# Patient Record
Sex: MALE | Race: BLACK OR AFRICAN AMERICAN | NOT HISPANIC OR LATINO | Employment: FULL TIME | URBAN - METROPOLITAN AREA
[De-identification: names, ages, dates, MRNs, and addresses within clinical notes are randomized per-mention and may not be internally consistent; named-entity substitution may affect disease eponyms.]

---

## 2018-01-24 ENCOUNTER — EVALUATION (OUTPATIENT)
Dept: PHYSICAL THERAPY | Facility: CLINIC | Age: 63
End: 2018-01-24
Payer: COMMERCIAL

## 2018-01-24 DIAGNOSIS — M25.512 PAIN OF BOTH SHOULDER JOINTS: Primary | ICD-10-CM

## 2018-01-24 DIAGNOSIS — M25.511 PAIN OF BOTH SHOULDER JOINTS: Primary | ICD-10-CM

## 2018-01-24 PROCEDURE — G8990 OTHER PT/OT CURRENT STATUS: HCPCS | Performed by: PHYSICAL THERAPIST

## 2018-01-24 PROCEDURE — 97110 THERAPEUTIC EXERCISES: CPT | Performed by: PHYSICAL THERAPIST

## 2018-01-24 PROCEDURE — G8991 OTHER PT/OT GOAL STATUS: HCPCS | Performed by: PHYSICAL THERAPIST

## 2018-01-24 PROCEDURE — 97162 PT EVAL MOD COMPLEX 30 MIN: CPT | Performed by: PHYSICAL THERAPIST

## 2018-01-24 RX ORDER — VALSARTAN 160 MG/1
TABLET ORAL DAILY
COMMUNITY

## 2018-01-24 RX ORDER — ASPIRIN 81 MG/1
81 TABLET ORAL DAILY
COMMUNITY

## 2018-01-24 NOTE — PROGRESS NOTES
PT Evaluation     Today's date: 2018  Patient name: Amelie Samson  : 1955  MRN: [id number]  Referring provider: Sandee Zamora DO  Dx: No diagnosis found  Assessment  Impairments: abnormal or restricted ROM, impaired physical strength and scapular dyskinesis    Assessment details: Amelie Samson is a 63 y o  male who presents to physical therapy with Pain of both shoulder joints  Pt has difficulty with lifting, reaching overhead, laterally, and behind his back, and performing heavy tasks for his job as a   Concern for rotator cuff involvement secondary to positive full can test, positive painful arc, and weakness into scaption and external rotation  Amelie Samson would benefit from formal physical therapy to address impairments as detailed, decrease pain, and restore maximal level of function for all home, work, and mobility tasks  Understanding of Dx/Px/POC: good   Prognosis: good    Goals  Short-term goals:  1  Pt will decrease pain by 1-2 points within 4 weeks  2  Pt will improve ROM by 5-10 degrees within 4 weeks  3  Pt will improve strength by 1/2 grade within 4 weeks  4  Pt will improve physical FS score by 19 points by discharge  Long-term goals  1  Pt will reach in all planes without scapular compensation and with pain <3/10 by discharge  2  Pt will report compliance and demonstrate understanding of home exercise program by discharge  3  Pt will lift 9year old daughter with pain <3/10 by discharge      Plan  Patient would benefit from: skilled PT  Planned modality interventions: cryotherapy  Planned therapy interventions: flexibility, functional ROM exercises, joint mobilization, manual therapy, neuromuscular re-education, patient education, strengthening, stretching, postural training, therapeutic exercise and home exercise program  Frequency: 2x week  Duration in weeks: 4  Treatment plan discussed with: patient        Subjective Evaluation    History of Present Illness  Mechanism of injury: Pt states that shoulder pain has been worsening over 6-7 months  Pt states that pain wakes him up at night but is able to go back to sleep after placing his hands over his head  Pt denies trauma, falls, precipitating incident  Pt notes that shoulders "loosen up" after he's awake for a while but has difficulty with donning coat and pushing up through L arm  Pt has popping and clicking occasionally in bilateral shoulders  Pt denies numbness and tingling in bilateral upper extremities  Pt will be following up with ortho MD on 18  Quality of life: good    Pain  At best pain rating: 3  At worst pain ratin  Location: bilateral shoulders  Quality: burning  Relieving factors: change in position  Aggravating factors: overhead activity  Progression: worsening    Social Support    Employment status: working  Hand dominance: left      Diagnostic Tests  Abnormal MRI: does not know results  Treatments  Previous treatment: medication (did not help)  Patient Goals  Patient goals for therapy: increased motion and increased strength  Patient goal: Short-term goals:  1  Pt will decrease pain by 1-2 points within 4 weeks  2  Pt will improve ROM by 5-10 degrees within 4 weeks  3  Pt will improve strength by 1/2 grade within 4 weeks  4  Pt will improve physical FS score by 19 points by discharge  Long-term goals  1  Pt will reach in all planes without scapular compensation and with pain <3/10 by discharge  2  Pt will report compliance and demonstrate understanding of home exercise program by discharge  3  Pt will lift 9year old daughter with pain <3/10 by discharge  Objective     Palpation   Left   Tenderness of the pectoralis major, pectoralis minor and posterior deltoid  Right Tenderness of the pectoralis major, pectoralis minor and posterior deltoid       Cervical/Thoracic Screen   Cervical range of motion within normal limits    Active Range of Motion   Left Shoulder   Flexion: 161 degrees   Abduction: 145 degrees with pain  External rotation BTH: T4   Internal rotation BTB: T10 with pain    Right Shoulder   Flexion: 172 degrees   Abduction: 143 (pop with eccentric motion) degrees   External rotation BTH: T1 with pain  Internal rotation BTB: L1 with pain    Additional Active Range of Motion Details  Pt demonstrates [address] [facility] posture at rest, winging of bilateral scapulae    Passive Range of Motion   Left Shoulder   Flexion: 172 degrees   Abduction: 170 degrees   External rotation 90°: 93 degrees   Internal rotation 90°: 90 degrees     Right Shoulder   Flexion: 175 degrees   Abduction: 172 degrees   External rotation 90°: 92 degrees   Internal rotation 90°: 80 degrees     Additional Passive Range of Motion Details  Pain at end range with overpressure    Strength/Myotome Testing     Left Shoulder     Planes of Motion   Flexion: 4-   Abduction: 4 (pain)   Adduction: 5   External rotation at 0°: 3+   External rotation at 90°: 4+   Internal rotation at 0°: 5   Internal rotation at 90°: 5     Right Shoulder     Planes of Motion   Flexion: 4-   Abduction: 3+   Adduction: 4+   External rotation at 0°: 3   External rotation at 90°: 4+   Internal rotation at 0°: 5   Internal rotation at 90°: 4     Left Elbow   Flexion: 5  Extension: 5    Right Elbow   Flexion: 5  Extension: 5    Left Wrist/Hand   Wrist extension: 5  Wrist flexion: 5    Right Wrist/Hand   Wrist extension: 5  Wrist flexion: 5    Tests     Left Shoulder   Positive full can and painful arc  Negative AC shear  Right Shoulder   Positive full can and painful arc  Negative AC shear         Precautions: Dm, HTN, bee sting allergy    Daily Treatment Diary     Manual  1/24/18            Bilateral shoulder PROM np            Inferior glides bilateral shoulders np            Scapular mobs np                                          Exercise Diary  1/24/18            Self-caudal glides 5"x2 ea            Strap IR stretch 15" x3 ea            TB IR/ER Org 10x ea            ube nv            pulleys  nv            Tb shoulder extension nv            TB low rows nv            Ball circles at wall nv            Shoulder flexion to 90 deg nv            Shoulder scaption to 90 deg  nv            Posterior capsule stretch nv            Doorway pec stretch nv            Scap retraction with TB ER nv                                                                                                           Modalities  1/24/18            CP PRN np

## 2018-01-24 NOTE — LETTER
2018    Emile Tenorio, [address]  [facility]  [place]   [address]  [id number]    Patient: Sammy Horton   YOB: 1955   Date of Visit: 2018       Dear Dr Diego Williamson:    Please review the attached summary of Hasmukh Jacob progress and our plan for continued therapy, and verify that you agree therapy should continue by signing the attached document and sending it back to our office  If you have any questions or concerns, please don't hesitate to call  Sincerely,        Katheryn Mcqueen, PT          CC: No Recipients            PT Evaluation     Today's date: 2018  Patient name: Sammy Horton  : 1955  MRN: [id number]  Referring provider: Gladys Flanagan DO  Dx: No diagnosis found  Assessment  Impairments: abnormal or restricted ROM, impaired physical strength and scapular dyskinesis    Assessment details: Sammy Horton is a 63 y o  male who presents to physical therapy with Pain of both shoulder joints  Pt has difficulty with lifting, reaching overhead, laterally, and behind his back, and performing heavy tasks for his job as a   Concern for rotator cuff involvement secondary to positive full can test, positive painful arc, and weakness into scaption and external rotation  Sammy Horton would benefit from formal physical therapy to address impairments as detailed, decrease pain, and restore maximal level of function for all home, work, and mobility tasks  Understanding of Dx/Px/POC: good   Prognosis: good    Goals  Short-term goals:  1  Pt will decrease pain by 1-2 points within 4 weeks  2  Pt will improve ROM by 5-10 degrees within 4 weeks  3  Pt will improve strength by 1/2 grade within 4 weeks  4  Pt will improve physical FS score by 19 points by discharge  Long-term goals  1  Pt will reach in all planes without scapular compensation and with pain <3/10 by discharge    2  Pt will report compliance and demonstrate understanding of home exercise program by discharge  3  Pt will lift 9year old daughter with pain <3/10 by discharge  Plan  Patient would benefit from: skilled PT  Planned modality interventions: cryotherapy  Planned therapy interventions: flexibility, functional ROM exercises, joint mobilization, manual therapy, neuromuscular re-education, patient education, strengthening, stretching, postural training, therapeutic exercise and home exercise program  Frequency: 2x week  Duration in weeks: 4  Treatment plan discussed with: patient        Subjective Evaluation    History of Present Illness  Mechanism of injury: Pt states that shoulder pain has been worsening over 6-7 months  Pt states that pain wakes him up at night but is able to go back to sleep after placing his hands over his head  Pt denies trauma, falls, precipitating incident  Pt notes that shoulders "loosen up" after he's awake for a while but has difficulty with donning coat and pushing up through L arm  Pt has popping and clicking occasionally in bilateral shoulders  Pt denies numbness and tingling in bilateral upper extremities  Pt will be following up with ortho MD on 18  Quality of life: good    Pain  At best pain rating: 3  At worst pain ratin  Location: bilateral shoulders  Quality: burning  Relieving factors: change in position  Aggravating factors: overhead activity  Progression: worsening    Social Support    Employment status: working  Hand dominance: left      Diagnostic Tests  Abnormal MRI: does not know results  Treatments  Previous treatment: medication (did not help)  Patient Goals  Patient goals for therapy: increased motion and increased strength  Patient goal: Short-term goals:  1  Pt will decrease pain by 1-2 points within 4 weeks  2  Pt will improve ROM by 5-10 degrees within 4 weeks  3  Pt will improve strength by 1/2 grade within 4 weeks  4  Pt will improve physical FS score by 19 points by discharge    Long-term goals  1  Pt will reach in all planes without scapular compensation and with pain <3/10 by discharge  2  Pt will report compliance and demonstrate understanding of home exercise program by discharge  3  Pt will lift 9year old daughter with pain <3/10 by discharge  Objective     Palpation   Left   Tenderness of the pectoralis major, pectoralis minor and posterior deltoid  Right Tenderness of the pectoralis major, pectoralis minor and posterior deltoid       Cervical/Thoracic Screen   Cervical range of motion within normal limits    Active Range of Motion   Left Shoulder   Flexion: 161 degrees   Abduction: 145 degrees with pain  External rotation BTH: T4   Internal rotation BTB: T10 with pain    Right Shoulder   Flexion: 172 degrees   Abduction: 143 (pop with eccentric motion) degrees   External rotation BTH: T1 with pain  Internal rotation BTB: L1 with pain    Additional Active Range of Motion Details  Pt demonstrates [address] [facility] posture at rest, winging of bilateral scapulae    Passive Range of Motion   Left Shoulder   Flexion: 172 degrees   Abduction: 170 degrees   External rotation 90°:  93 degrees   Internal rotation 90°:  90 degrees     Right Shoulder   Flexion: 175 degrees   Abduction: 172 degrees   External rotation 90°:  92 degrees   Internal rotation 90°:  80 degrees     Additional Passive Range of Motion Details  Pain at end range with overpressure    Strength/Myotome Testing     Left Shoulder     Planes of Motion   Flexion: 4-   Abduction: 4 (pain)   Adduction: 5   External rotation at 0°:  3+   External rotation at 90°:  4+   Internal rotation at 0°:  5   Internal rotation at 90°:  5     Right Shoulder     Planes of Motion   Flexion: 4-   Abduction: 3+   Adduction: 4+   External rotation at 0°:  3   External rotation at 90°:  4+   Internal rotation at 0°:  5   Internal rotation at 90°:  4     Left Elbow   Flexion: 5  Extension: 5    Right Elbow   Flexion: 5  Extension: 5    Left Wrist/Hand   Wrist extension: 5  Wrist flexion: 5    Right Wrist/Hand   Wrist extension: 5  Wrist flexion: 5    Tests     Left Shoulder   Positive full can and painful arc  Negative AC shear  Right Shoulder   Positive full can and painful arc  Negative AC shear         Precautions: Dm, HTN, bee sting allergy    Daily Treatment Diary     Manual  1/24/18            Bilateral shoulder PROM np            Inferior glides bilateral shoulders np            Scapular mobs np                                          Exercise Diary  1/24/18            Self-caudal glides 5"x2 ea            Strap IR stretch 15" x3 ea            TB IR/ER Org 10x ea            ube nv            pulleys  nv            Tb shoulder extension nv            TB low rows nv            Ball circles at wall nv            Shoulder flexion to 90 deg nv            Shoulder scaption to 90 deg  nv            Posterior capsule stretch nv            Doorway pec stretch nv            Scap retraction with TB ER nv                                                                                                           Modalities  1/24/18            CP PRN np                                                            Based upon review of the patient's progress and continued therapy plan, it is my medical opinion that Mundo Schmidt should continue physical therapy treatment at the [facility] [address]:

## 2018-01-25 ENCOUNTER — OFFICE VISIT (OUTPATIENT)
Dept: PHYSICAL THERAPY | Facility: CLINIC | Age: 63
End: 2018-01-25
Payer: COMMERCIAL

## 2018-01-25 DIAGNOSIS — M25.512 PAIN OF BOTH SHOULDER JOINTS: Primary | ICD-10-CM

## 2018-01-25 DIAGNOSIS — M25.511 PAIN OF BOTH SHOULDER JOINTS: Primary | ICD-10-CM

## 2018-01-25 PROCEDURE — 97110 THERAPEUTIC EXERCISES: CPT

## 2018-01-25 PROCEDURE — 97140 MANUAL THERAPY 1/> REGIONS: CPT

## 2018-01-31 ENCOUNTER — OFFICE VISIT (OUTPATIENT)
Dept: PHYSICAL THERAPY | Facility: CLINIC | Age: 63
End: 2018-01-31
Payer: COMMERCIAL

## 2018-01-31 DIAGNOSIS — M25.511 PAIN OF BOTH SHOULDER JOINTS: Primary | ICD-10-CM

## 2018-01-31 DIAGNOSIS — M25.512 PAIN OF BOTH SHOULDER JOINTS: Primary | ICD-10-CM

## 2018-01-31 PROCEDURE — 97140 MANUAL THERAPY 1/> REGIONS: CPT

## 2018-01-31 PROCEDURE — 97110 THERAPEUTIC EXERCISES: CPT

## 2018-01-31 NOTE — PROGRESS NOTES
Daily Note     Today's date: 2018  Patient name: Sammy Horton  : 1955  MRN: [id number]  Referring provider: Gladys Flanagan DO  Dx:   Encounter Diagnosis   Name Primary?  Pain of both shoulder joints Yes       Start Time: 1730  Stop Time:   Total time in clinic (min): 55 minutes    Subjective: Pt presents to PT reporting no pain at the moment, states he is feeling really good          Objective: See treatment diary below    Precautions: Dm, HTN, bee sting allergy     Daily Treatment Diary      Manual  -                 Bilateral shoulder PROM np  PTA PK  HY PTA                 Inferior glides bilateral shoulders np  np  NP                 Scapular mobs np  np  NP                                                                       Exercise Diary                   Self-caudal glides 5"x2 ea  on table 5" x 10 ea  on table 5" x 10 ea                 Strap IR stretch 15" x3 ea  15" x3 ea  15" x3 ea                 TB IR/ER Org 10x ea  org 10x ea  Org 10xea                 Ube: Fwrd/bkwrd nv  3' ea  3'/3'                 pulleys  nv  unable, too tall  NP                 Tb shoulder extension nv  grn 3" x 15  Grn 15x3"                 TB low rows nv  grn 3" x15  Grn 15x3"                 Ball circles at wall nv  nv  Red 20x ea                 Shoulder flexion to 90 deg nv  10x  10x                 Shoulder scaption to 90 deg  nv  10x  10x                 Posterior capsule stretch nv  15" x3 ea  15" x 3 ea                 Doorway pec stretch nv  nv  NV                 Scap retraction with TB ER nv  org 3" x10  Org 15x3"                                                                                                                                                                                               Modalities  18-                 CP PRN np  @ home  NP                                                                      Assessment: Pt tolerated treatment well requiring several cues for correct techique  Patient could benefit from continued PT to increase ROM and function  Pt states he has appointment with surgeon tomorrow prior to coming to Pt  Continue to progress towards goals of increased strength and flexibility  Plan: Continue per plan of care

## 2018-02-01 ENCOUNTER — OFFICE VISIT (OUTPATIENT)
Dept: PHYSICAL THERAPY | Facility: CLINIC | Age: 63
End: 2018-02-01
Payer: COMMERCIAL

## 2018-02-01 VITALS
DIASTOLIC BLOOD PRESSURE: 88 MMHG | SYSTOLIC BLOOD PRESSURE: 151 MMHG | WEIGHT: 250 LBS | BODY MASS INDEX: 28.93 KG/M2 | HEIGHT: 78 IN | HEART RATE: 82 BPM

## 2018-02-01 DIAGNOSIS — M75.42 SUBACROMIAL IMPINGEMENT OF LEFT SHOULDER: ICD-10-CM

## 2018-02-01 DIAGNOSIS — M25.512 PAIN OF BOTH SHOULDER JOINTS: Primary | ICD-10-CM

## 2018-02-01 DIAGNOSIS — M25.511 PAIN OF BOTH SHOULDER JOINTS: Primary | ICD-10-CM

## 2018-02-01 DIAGNOSIS — M75.41 SUBACROMIAL IMPINGEMENT OF RIGHT SHOULDER: Primary | ICD-10-CM

## 2018-02-01 PROBLEM — G89.29 CHRONIC LEFT SHOULDER PAIN: Status: ACTIVE | Noted: 2018-02-01

## 2018-02-01 PROCEDURE — 99203 OFFICE O/P NEW LOW 30 MIN: CPT | Performed by: ORTHOPAEDIC SURGERY

## 2018-02-01 PROCEDURE — 97110 THERAPEUTIC EXERCISES: CPT

## 2018-02-01 PROCEDURE — 97140 MANUAL THERAPY 1/> REGIONS: CPT

## 2018-02-01 NOTE — PROGRESS NOTES
Daily Note     Today's date: 2018  Patient name: Debi Katz  : 1955  MRN: [id number]  Referring provider: Keenan Boeck, DO  Dx:   Encounter Diagnosis   Name Primary?  Pain of both shoulder joints Yes                  Subjective: Pt presents to PT reporting pain in B shoulders grading the pain a 2 5/10  Pt reports appt with ortho MD stating he was advised of impingment of L and R shoulder, L>R  Pt reports he is scheduled for an MRI 18 and will f/u with MD 18  Pt reports "soreness" after last session but still able to sleep through the night  Pt reports moderate soreness in L shoulder grading it a 5/10  Pt  Was advised to use CP at home with education for use and safety  Pt reports a verbal understanding         Objective: See treatment diary below    Precautions: Dm, HTN, bee sting allergy     Daily Treatment Diary      Manual  -18               Bilateral shoulder PROM np  PTA PK  HY PTA  18               Inferior glides bilateral shoulders np  np  NP  np               Scapular mobs np  np  NP  np                                                                     Exercise Diary  -18               Self-caudal glides 5"x2 ea  on table 5" x 10 ea  on table 5" x 10 ea  on table 5" x 10 ea               Strap IR stretch 15" x3 ea  15" x3 ea  15" x3 ea  15" x3 ea               TB IR/ER Org 10x ea  org 10x ea  Org 10xea   grn 10xea               Ube: Fwrd/bkwrd nv  3' ea  3'/3'  3'/3'               pulleys  nv  unable, too tall    --------    --------     --------     -------     --------     --------       Tb shoulder extension nv  grn 3" x 15  Grn 15x3"  Grn 20x3"               TB low rows nv  grn 3" x15  Grn 15x3"  Grn 20x3"               Ball circles at wall nv  nv  Red 20x ea   Red 30x ea               Shoulder flexion to 90 deg nv  10x  10x  15x               Shoulder scaption to 90 deg  nv  10x  10x  15x               Posterior capsule stretch nv  15" x3 ea  15" x 3 ea  15" x 3 ea               Doorway pec stretch nv  nv  NV  15" x3               Scap retraction with TB ER nv  org 3" x10  Org 15x3"  Org 15x3"                                                                                                                                                                                             Modalities  1/24/18 1-25 1/31 2/1/18               CP PRN np  @ home  NP  @ home                                                                    Assessment: Pt tolerated manual well add distraction with PROM resulted in stretches at end range with no complaints of pain  Patient could benefit from continued PT to increase ROM and function  Continue to progress towards goals of increased strength and flexibility  Plan: Continue per plan of care

## 2018-02-01 NOTE — PATIENT INSTRUCTIONS
Activities as tolerated, continue PT as prescribed for bilateral shoulders  Follow up after MRI L shoulder to discuss results

## 2018-02-01 NOTE — PROGRESS NOTES
Assessment/Plan:  Assessment/Plan   Diagnoses and all orders for this visit:    Subacromial impingement of right shoulder  -     Ambulatory referral to Physical Therapy; Future    Subacromial impingement of left shoulder  -     MRI shoulder left wo contrast; Future  -     Ambulatory referral to Physical Therapy; Future        - based on physical exam, significant weakness of left shoulder rotator cuff, plan is to obtain MRI of L shoulder to evaluate pathology  - in meantime, he will continue PT for bilateral shoulders as it has already improved his symptoms in the past week   - follow up after MRI L shoulder to review results and discuss possible treatment  - cortisone injection deferred today due to minimal pain and insulin dependent DM    Subjective:   Patient ID: Abbey Moore is a 63 y o  male  The patient presents with a chief complaint of bilateral shoulder pain and weakness, left worse than right  The pain began 6 month(s) ago and is not associated with an acute injury  The patient describes the pain as aching and dull, 6 out of 10 in intensity,  intermittent in timing, and localizes the pain to the  bilateral subacromial joint  The pain is worse with movement and overhead work and relieved by rest   The pain is not associated with numbness and tingling  The pain is not associated with constitutional symptoms  The patient is not awoken at night by the pain  The patient has had a short period of PT for bilateral shoulders to this point which has already begun to improve his symptoms  The following portions of the patient's history were reviewed and updated as appropriate: allergies, current medications, past family history, past medical history, past social history, past surgical history and problem list     Review of Systems   Constitutional: Negative  Negative for chills and fever  HENT: Negative  Respiratory: Negative  Negative for shortness of breath and wheezing  Cardiovascular: Negative  Negative for chest pain and palpitations  Gastrointestinal: Negative  Negative for nausea and vomiting  Endocrine: Negative  Genitourinary: Negative  Skin: Negative  Allergic/Immunologic: Negative  Neurological: Negative  Negative for numbness  Hematological: Negative  Psychiatric/Behavioral: Negative  Objective:  Right Shoulder Exam     Range of Motion   Active Abduction: normal   Passive Abduction: normal   Forward Flexion: normal   External Rotation: normal     Muscle Strength   Abduction: 5/5   External Rotation: 5/5     Tests   Cross Arm: negative  Drop Arm: negative  Hawkin's test: positive  Impingement: positive    Other   Sensation: normal  Pulse: present    Comments:  Negative aaron's, negative hornblower      Left Shoulder Exam     Range of Motion   Active Abduction: normal   Passive Abduction: normal   Forward Flexion: normal   External Rotation: normal     Muscle Strength   Abduction: 4/5   External Rotation: 4/5     Tests   Hawkin's test: positive  Impingement: positive    Other   Sensation: normal  Pulse: present     Comments:  Positive aaron's test, positive hornblower            Physical Exam   Constitutional: He is oriented to person, place, and time  He appears well-developed and well-nourished  HENT:   Head: Normocephalic and atraumatic  Neck: Normal range of motion  Cardiovascular: Normal rate and intact distal pulses  Pulmonary/Chest: Effort normal  He has no wheezes  Abdominal: Soft  He exhibits no distension  Neurological: He is alert and oriented to person, place, and time  Skin: Skin is warm and dry  Psychiatric: He has a normal mood and affect          XR report of bilateral shoulders taken at OSH reviewed: no evidence of fracture or arthritic change

## 2018-02-05 ENCOUNTER — OFFICE VISIT (OUTPATIENT)
Dept: PHYSICAL THERAPY | Facility: CLINIC | Age: 63
End: 2018-02-05
Payer: COMMERCIAL

## 2018-02-05 DIAGNOSIS — M25.511 PAIN OF BOTH SHOULDER JOINTS: Primary | ICD-10-CM

## 2018-02-05 DIAGNOSIS — M25.512 PAIN OF BOTH SHOULDER JOINTS: Primary | ICD-10-CM

## 2018-02-05 PROCEDURE — 97140 MANUAL THERAPY 1/> REGIONS: CPT

## 2018-02-05 PROCEDURE — 97110 THERAPEUTIC EXERCISES: CPT

## 2018-02-05 NOTE — PROGRESS NOTES
Daily Note     Today's date: 2018  Patient name: Vicki Nuno  : 1955  MRN: [id number]  Referring provider: Kirstin Hernandez, DO  Dx:   Encounter Diagnosis   Name Primary?  Pain of both shoulder joints Yes                  Subjective: Pt  Noted that he feels better since starting therapy         Objective: See treatment diary below  Precautions: Dm, HTN, bee sting allergy     Daily Treatment Diary      Manual  -18  2/             Bilateral shoulder PROM np  PTA PK  HY PTA  18  15             Inferior glides bilateral shoulders np  np  NP  np               Scapular mobs np  np  NP  np                                                                     Exercise Diary  18  25             Self-caudal glides 5"x2 ea  on table 5" x 10 ea  on table 5" x 10 ea  on table 5" x 10 ea  NV             Strap IR stretch 15" x3 ea  15" x3 ea  15" x3 ea  15" x3 ea NV             TB IR/ER Org 10x ea  org 10x ea  Org 10xea   grn 10xea  org 10xea GTB NV             Ube: Fwrd/bkwrd nv  3' ea  3'/3'  3'/3'   3'/3'             pulleys  nv  unable, too tall    --------    --------     --------     -------     --------     --------       Tb shoulder extension nv  grn 3" x 15  Grn 15x3"  Grn 20x3"  Grn 20x3"             TB low rows nv  grn 3" x15  Grn 15x3"  Grn 20x3"  Grn 20x3"             Ball circles at wall nv  nv  Red 20x ea   Red 30x ea  Red 30x ea             Shoulder flexion to 90 deg nv  10x  10x  15x  15x             Shoulder scaption to 90 deg  nv  10x  10x  15x  15x             Posterior capsule stretch nv  15" x3 ea  15" x 3 ea  15" x 3 ea  15" x 3 ea             Doorway pec stretch nv  nv  NV  15" x3  15" x3             Scap retraction with TB ER nv  org 3" x10  Org 15x3"  Org 15x3"  NV                                                                                                                                                                                         Modalities  1/24/18 1-25 1/31 2/1/18               CP PRN np  @ home  NP  @ home                                                                        Assessment: Pt  Needed to leave earlier today to pick his daughter up not all exercises performed resume Nv  Pt  Was able to perform exercises with no UT compensation for shoulder flex and scap  Plan: Continue per plan of care        Self directed exercises 10min 5:30-5:40

## 2018-02-07 ENCOUNTER — APPOINTMENT (OUTPATIENT)
Dept: PHYSICAL THERAPY | Facility: CLINIC | Age: 63
End: 2018-02-07
Payer: COMMERCIAL

## 2018-02-11 ENCOUNTER — HOSPITAL ENCOUNTER (OUTPATIENT)
Dept: MRI IMAGING | Facility: HOSPITAL | Age: 63
Discharge: HOME | End: 2018-02-11
Payer: COMMERCIAL

## 2018-02-11 DIAGNOSIS — M75.42 SUBACROMIAL IMPINGEMENT OF LEFT SHOULDER: ICD-10-CM

## 2018-02-11 PROCEDURE — 73221 MRI JOINT UPR EXTREM W/O DYE: CPT

## 2018-02-12 ENCOUNTER — OFFICE VISIT (OUTPATIENT)
Dept: PHYSICAL THERAPY | Facility: CLINIC | Age: 63
End: 2018-02-12
Payer: COMMERCIAL

## 2018-02-12 DIAGNOSIS — M25.511 PAIN OF BOTH SHOULDER JOINTS: Primary | ICD-10-CM

## 2018-02-12 DIAGNOSIS — M25.512 PAIN OF BOTH SHOULDER JOINTS: Primary | ICD-10-CM

## 2018-02-12 PROCEDURE — 97140 MANUAL THERAPY 1/> REGIONS: CPT | Performed by: PHYSICAL THERAPIST

## 2018-02-12 PROCEDURE — 97112 NEUROMUSCULAR REEDUCATION: CPT | Performed by: PHYSICAL THERAPIST

## 2018-02-12 PROCEDURE — 97110 THERAPEUTIC EXERCISES: CPT | Performed by: PHYSICAL THERAPIST

## 2018-02-12 NOTE — PROGRESS NOTES
Daily Note     Today's date: 2018  Patient name: Slade Atkinson  : 1955  MRN: [id number]  Referring provider: Jd Vega DO  Dx:   Encounter Diagnosis   Name Primary?  Pain of both shoulder joints Yes                  Subjective: Pt rates pain in shoulders 0/10 at start of session  Pt reports some popping/cracking in the shoulders in the morning  Pt expressed concern that he is to be scheduled for liver resection  Pt had MRI of L shoulder but has not yet received results        Objective: See treatment diary below    Precautions: Dm, HTN, bee sting allergy     Daily Treatment Diary      Manual  -18           Bilateral shoulder PROM np  PTA PK  HY PTA  18  15  15 min           Inferior glides bilateral shoulders np  np  NP  np    5 min           Scapular mobs np  np  NP  np    np                                                                 Exercise Diary  18           Self-caudal glides 5"x2 ea  on table 5" x 10 ea  on table 5" x 10 ea  on table 5" x 10 ea  NV  nv           Strap IR stretch 15" x3 ea  15" x3 ea  15" x3 ea  15" x3 ea NV  30"x 3           TB IR/ER Org 10x ea  org 10x ea  Org 10xea   grn 10xea  org 10xea GTB NV  grn 15x ea           Ube: Fwrd/bkwrd nv  3' ea  3'/3'  3'/3'   3'/3'  3 min ea           pulleys  nv  unable, too tall    --------    --------     --------     -------     --------     --------       Tb shoulder extension nv  grn 3" x 15  Grn 15x3"  Grn 20x3"  Grn 20x3"  grn 3" 2x15 ea           TB low rows nv  grn 3" x15  Grn 15x3"  Grn 20x3"  Grn 20x3"  grn 3" 2x15 ea           Ball circles at wall nv  nv  Red 20x ea   Red 30x ea  Red 30x ea  nv           Shoulder flexion to 90 deg nv  10x  10x  15x  15x  2# 20x ea           Shoulder scaption to 90 deg  nv  10x  10x  15x  15x  2# 20x ea           Posterior capsule stretch nv  15" x3 ea  15" x 3 ea  15" x 3 ea  15" x 3 ea nv           Doorway pec stretch nv  nv  NV  15" x3  15" x3  nv           Scap retraction with TB ER nv  org 3" x10  Org 15x3"  Org 15x3"  NV  nv            serratus punch           4# 20x ea            PNF D1 & D2           nv                                                                                                                                         Modalities  1/24/18 1-25 1/31 2/1/18               CP PRN np  @ home  NP  @ home                                                                      Assessment: Tolerated treatment well  Patient demonstrated fatigue post treatment and would benefit from continued PT Pt was challenged with addition of weight to scaption, requiring cues to avoid compensation on L  Pt was challenged with ER on L side but was able to maintain good scapular positioning  FOTO 60 (intake 50)      Plan: Continue per plan of care  and Progress treatment as tolerated  Plan to add PNF diagonals at NV

## 2018-02-14 ENCOUNTER — OFFICE VISIT (OUTPATIENT)
Dept: PHYSICAL THERAPY | Facility: CLINIC | Age: 63
End: 2018-02-14
Payer: COMMERCIAL

## 2018-02-14 DIAGNOSIS — M25.511 PAIN OF BOTH SHOULDER JOINTS: Primary | ICD-10-CM

## 2018-02-14 DIAGNOSIS — M25.512 PAIN OF BOTH SHOULDER JOINTS: Primary | ICD-10-CM

## 2018-02-14 PROCEDURE — 97110 THERAPEUTIC EXERCISES: CPT

## 2018-02-14 PROCEDURE — 97140 MANUAL THERAPY 1/> REGIONS: CPT

## 2018-02-14 NOTE — PROGRESS NOTES
Daily Note     Today's date: 2018  Patient name: Zeeshan Andino  : 1955  MRN: [id number]  Referring provider: Mrita Silverio DO  Dx:   Encounter Diagnosis   Name Primary?  Pain of both shoulder joints Yes       Start Time: 1700  Stop Time: 1745  Total time in clinic (min): 45 minutes    Subjective: Pt rates pain in shoulders 0/10 at start of session  Pt states he noticed his R shoulder is getting better and stronger         Objective: See treatment diary below    Precautions: Dm, HTN, bee sting allergy     Daily Treatment Diary      Manual  -18         Bilateral shoulder PROM np  PTA PK  HY PTA  18  15  15 min  20         Inferior glides bilateral shoulders np  np  NP  np    5 min  NP         Scapular mobs np  np  NP  np    np  NP                                                               Exercise Diary  -18         Self-caudal glides 5"x2 ea  on table 5" x 10 ea  on table 5" x 10 ea  on table 5" x 10 ea  NV  nv  NV         Strap IR stretch 15" x3 ea  15" x3 ea  15" x3 ea  15" x3 ea NV  30"x 3  3x30"         TB IR/ER Org 10x ea  org 10x ea  Org 10xea   grn 10xea  org 10xea GTB NV  grn 15x ea   grn 15x ea         Ube: Fwrd/bkwrd nv  3' ea  3'/3'  3'/3'   3'/3'  3 min ea  3'/3'         pulleys  nv  unable, too tall    --------    --------     --------     -------     --------     --------       Tb shoulder extension nv  grn 3" x 15  Grn 15x3"  Grn 20x3"  Grn 20x3"  grn 3" 2x15 ea NV          TB low rows nv  grn 3" x15  Grn 15x3"  Grn 20x3"  Grn 20x3"  grn 3" 2x15 ea  NV         Ball circles at wall nv  nv  Red 20x ea   Red 30x ea  Red 30x ea  nv  NV         Shoulder flexion to 90 deg nv  10x  10x  15x  15x  2# 20x ea  NV         Shoulder scaption to 90 deg  nv  10x  10x  15x  15x  2# 20x ea  NV         Posterior capsule stretch nv  15" x3 ea  15" x 3 ea  15" x 3 ea  15" x 3 ea nv  NV         Doorway pec stretch nv  nv  NV  15" x3  15" x3  nv  NV         Scap retraction with TB ER nv  org 3" x10  Org 15x3"  Org 15x3"  NV  nv  NV          serratus punch           4# 20x ea  NV          PNF D1 & D2           nv  NV                                                                                                                                       Modalities  1/24/18 1-25 1/31 2/1/18               CP PRN np  @ home  NP  @ home                                                                      Assessment: Tolerated treatment well  Patient demonstrated fatigue post treatment and would benefit from continued PT Pt unable to perform entire exercise program due to time constraints  Pt needed minimal VCing for proper form and technique with Tband exercises  Pt has MD appointment 2/15/18, to inform PT with updated plans  Plan: Continue per plan of care  and Progress treatment as tolerated  Plan to add PNF diagonals at NV

## 2018-02-15 ENCOUNTER — OFFICE VISIT (OUTPATIENT)
Dept: OBGYN CLINIC | Facility: OTHER | Age: 63
End: 2018-02-15
Payer: COMMERCIAL

## 2018-02-15 VITALS
HEART RATE: 75 BPM | WEIGHT: 235 LBS | SYSTOLIC BLOOD PRESSURE: 167 MMHG | BODY MASS INDEX: 25.18 KG/M2 | DIASTOLIC BLOOD PRESSURE: 97 MMHG

## 2018-02-15 DIAGNOSIS — M75.112 INCOMPLETE TEAR OF LEFT ROTATOR CUFF: Primary | ICD-10-CM

## 2018-02-15 PROBLEM — M75.42 SUBACROMIAL IMPINGEMENT OF LEFT SHOULDER: Status: RESOLVED | Noted: 2018-02-01 | Resolved: 2018-02-15

## 2018-02-15 PROCEDURE — 99213 OFFICE O/P EST LOW 20 MIN: CPT | Performed by: ORTHOPAEDIC SURGERY

## 2018-02-15 RX ORDER — VALSARTAN AND HYDROCHLOROTHIAZIDE 320; 25 MG/1; MG/1
TABLET, FILM COATED ORAL
Refills: 11 | COMMUNITY
Start: 2018-02-10

## 2018-02-15 NOTE — PROGRESS NOTES
Patient presents to review the results of his MRI scan left shoulder  He has started physical therapy and has noted some improvement but still has weakness on the left shoulder, the right is feeling much better  He incidentally notes today that he is scheduled for a liver resection down at [facility] in [place] in the near future  Left shoulder full range of motion with weakness with abduction external rotation strength testing compared to the right side 4/5, positive drop arm positive Ross and Neer impingement signs    MRI left shoulder reviewed by myself my report is as follows    Small near full-thickness so insertional supraspinatus tear with no retraction and no atrophy, some articular sided fibers may still be attached    Impression left small near full-thickness insertional supraspinatus tear    Given the fact the patient has to undergo a liver procedure I think he should get that taken care of and we should continue with physical therapy may be addressing his left shoulder surgically if the therapy does not improve and if he recovers uneventfully from the liver procedure   We will see him back in 2 months to check his progress he has already started with physical therapy and they will continue his treatment

## 2018-02-15 NOTE — PATIENT INSTRUCTIONS
Continue physical therapy, follow-up after his abdominal procedure in 2 months for repeat evaluation

## 2018-02-19 ENCOUNTER — APPOINTMENT (OUTPATIENT)
Dept: PHYSICAL THERAPY | Facility: CLINIC | Age: 63
End: 2018-02-19
Payer: COMMERCIAL

## 2018-02-21 ENCOUNTER — EVALUATION (OUTPATIENT)
Dept: PHYSICAL THERAPY | Facility: CLINIC | Age: 63
End: 2018-02-21
Payer: COMMERCIAL

## 2018-02-21 ENCOUNTER — TRANSCRIBE ORDERS (OUTPATIENT)
Dept: PHYSICAL THERAPY | Facility: CLINIC | Age: 63
End: 2018-02-21

## 2018-02-21 DIAGNOSIS — M25.512 PAIN OF BOTH SHOULDER JOINTS: Primary | ICD-10-CM

## 2018-02-21 DIAGNOSIS — M25.511 PAIN OF BOTH SHOULDER JOINTS: Primary | ICD-10-CM

## 2018-02-21 PROCEDURE — G8990 OTHER PT/OT CURRENT STATUS: HCPCS | Performed by: PHYSICAL THERAPIST

## 2018-02-21 PROCEDURE — 97140 MANUAL THERAPY 1/> REGIONS: CPT | Performed by: PHYSICAL THERAPIST

## 2018-02-21 PROCEDURE — 97110 THERAPEUTIC EXERCISES: CPT | Performed by: PHYSICAL THERAPIST

## 2018-02-21 PROCEDURE — G8991 OTHER PT/OT GOAL STATUS: HCPCS | Performed by: PHYSICAL THERAPIST

## 2018-02-21 NOTE — LETTER
2018    Frankey Eon, [address]  [place]  [address]  [id number]    Patient: Benson Dasilva   YOB: 1955   Date of Visit: 2018     Encounter Diagnosis     ICD-10-CM    1  Pain of both shoulder joints M25 511     M25 512        Dear Dr Jones Wood:    Please review the attached Plan of Care from [facility] recent visit  Please verify that you agree therapy should continue by signing the attached document and sending it back to our office  If you have any questions or concerns, please don't hesitate to call  Sincerely,    Elsi Durant, PT      Referring Provider:      I certify that I have read the below Plan of Care and certify the need for these services furnished under this plan of treatment while under my care  Frankey Eon, [address] [facility]  [place]  [address]  [id number]  [address] [place] [address] Avenue: [phone]          PT Re-Evaluation     Today's date: 2018  Patient name: Benson Dasilva  : 1955  MRN: [id number]  Referring provider: Naya Berger DO  Dx:   Encounter Diagnosis   Name Primary?  Pain of both shoulder joints Yes                  Assessment  Impairments: abnormal or restricted ROM, impaired physical strength and scapular dyskinesis    Assessment details: Benson Dasilva is a 63 y o  male who presents to physical therapy with Pain of both shoulder joints  Pt states he continues to "baby" the L shoulder and sometimes compensates  Pt reports greater ease with reaching overhead but continues to have difficulty with reaching behind his back  Pt continues to have difficulty with heavy lifting  Pt has difficulty with reaching to the backseat of his car  Pt has improved PROM to full in all planes bilaterally  Pt has minor deficits remaining in AROM  Pt continues to have significant strength deficits in bilateral shoulders, R > L  Pt has greatest weakness into flexion, scaption, and external rotation   Benson Dasilva would benefit from formal physical therapy to address impairments as detailed, decrease pain, and restore maximal level of function for all home, work, and mobility tasks  Thank you for this referral     Understanding of Dx/Px/POC: good   Prognosis: good    Goals  Short-term goals:  1  Pt will decrease pain by 1-2 points within 4 weeks  - met  2  Pt will improve ROM by 5-10 degrees within 4 weeks  - met  3  Pt will improve strength by 1/2 grade within 4 weeks  - partially met  4  Pt will improve physical FS score by 19 points by discharge  - partially met  Long-term goals  1  Pt will reach in all planes without scapular compensation and with pain <3/10 by discharge  - partially met  2  Pt will report compliance and demonstrate understanding of home exercise program by discharge  - met  3  Pt will [de-identified] 9year old daughter with pain <3/10 by discharge  - partially met    Plan  Patient would benefit from: skilled PT  Planned modality interventions: cryotherapy  Planned therapy interventions: flexibility, functional ROM exercises, joint mobilization, manual therapy, neuromuscular re-education, patient education, strengthening, stretching, postural training, therapeutic exercise and home exercise program  Frequency: 2x week  Duration in weeks: 4  Treatment plan discussed with: patient        Subjective Evaluation    History of Present Illness  Mechanism of injury: Pt feels about 70-80% improved since starting therapy  Pt has been compliant with therapy attendance and performing HEP  Pt states that MRI showed small tear in rotator cuff  Pt returns to MD in April  Pt is being scheduled for liver resection to address cyst and hemangioma  Pt denies numbness and tingling  Pt reports occasional popping in shoulders    Quality of life: good    Pain  At best pain ratin  At worst pain ratin  Location: bilateral shoulders  Quality: burning  Relieving factors: change in position  Progression: improved    Social Support    Employment status: working  Hand dominance: left      Diagnostic Tests  Abnormal MRI: partial rotator cuff tear  Treatments  Previous treatment: medication (did not help)  Patient Goals  Patient goals for therapy: increased motion and increased strength          Objective     Palpation   Left   Tenderness of the pectoralis major and pectoralis minor       Cervical/Thoracic Screen   Cervical range of motion within normal limits    Active Range of Motion   Left Shoulder   Flexion: 170 degrees   Abduction: 160 degrees   External rotation BTH: T3   Internal rotation BTB: T7     Right Shoulder   Flexion: 174 degrees   Abduction: 163 (pop with eccentric motion- persists at RE) degrees   External rotation BTH: T3   Internal rotation BTB: T7     Additional Active Range of Motion Details  Pt demonstrates [facility] posture at rest, winging of bilateral scapulae; persists at RE    Passive Range of Motion   Left Shoulder   Flexion: 178 degrees   Abduction: 176 degrees   External rotation 90°:  93 degrees   Internal rotation 90°:  90 degrees     Right Shoulder   Flexion: 180 degrees   Abduction: 178 degrees   External rotation 90°:  92 degrees   Internal rotation 90°:  85 degrees     Additional Passive Range of Motion Details  Pain at end range with overpressure- at RE stretch only, no pain    Strength/Myotome Testing     Left Shoulder     Planes of Motion   Flexion: 4+   Abduction: 4+ (pain)   Adduction: 5   External rotation at 0°:  4-   External rotation at 90°:  4+   Internal rotation at 0°:  5   Internal rotation at 90°:  5     Right Shoulder     Planes of Motion   Flexion: 4-   Abduction: 4-   Adduction: 5   External rotation at 0°:  4   External rotation at 90°:  5   Internal rotation at 0°:  5   Internal rotation at 90°:  5     Left Elbow   Flexion: 5  Extension: 5    Right Elbow   Flexion: 5  Extension: 5    Left Wrist/Hand   Wrist extension: 5  Wrist flexion: 5    Right Wrist/Hand   Wrist extension: 5  Wrist flexion: 5    Tests     Left Shoulder   Positive full can  Negative AC shear, drop arm, Hawkin's and painful arc  Right Shoulder   Positive full can and Hawkin's  Negative AC shear, drop arm and painful arc         Precautions: Dm, HTN, bee sting allergy    Daily Treatment Diary      Manual  1/24/ 1-25 1/31 2/1/18 2/5 2/12 2/14 2/21       Bilateral shoulder PROM np  PTA PK  HY PTA  18  15  15 min  20  10 min       Inferior glides bilateral shoulders np  np  NP  np    5 min  NP  np       Scapular mobs np  np  NP  np    np  NP  np        new measurements               15 min                                     Exercise Diary  1/24 1-25 1/31 2/1/18 2/5 2/12 2/14 2/21       Self-caudal glides 5"x2 ea  on table 5" x 10 ea  on table 5" x 10 ea  on table 5" x 10 ea  NV  nv  NV  dc       Strap IR stretch 15" x3 ea  15" x3 ea  15" x3 ea  15" x3 ea NV  30"x 3  3x30"  nv       TB IR/ER Org 10x ea  org 10x ea  Org 10xea   grn 10xea  org 10xea GTB NV  grn 15x ea   grn 15x ea  nv       Ube: Fwrd/bkwrd nv  3' ea  3'/3'  3'/3'   3'/3'  3 min ea  3'/3'  nv       pulleys  nv  unable, too tall    --------    --------     --------     -------     --------     --------       Tb shoulder extension nv  grn 3" x 15  Grn 15x3"  Grn 20x3"  Grn 20x3"  grn 3" 2x15 ea NV   nv       TB low rows nv  grn 3" x15  Grn 15x3"  Grn 20x3"  Grn 20x3"  grn 3" 2x15 ea  NV  nv       Ball circles at wall nv  nv  Red 20x ea   Red 30x ea  Red 30x ea  nv  NV  nv       Shoulder flexion full nv  10x  10x  15x  15x  2# 20x ea NV  20x       Shoulder scaption full  nv  10x  10x  15x  15x  2# 20x ea  NV  20x       Posterior capsule stretch nv  15" x3 ea  15" x 3 ea  15" x 3 ea  15" x 3 ea nv  NV  nv       Doorway pec stretch nv  nv  NV  15" x3  15" x3  nv  NV  nv       Scap retraction with TB ER nv  org 3" x10  Org 15x3"  Org 15x3"  NV  nv  NV  org 3"x20        serratus punch           4# 20x ea  NV  nv        PNF D1 & D2           nv  NV  10x ea        body blade IR/ER at 0deg & 45 deg               nv        sidelying ER               nv                                                                                     Modalities  1/24/18 1-25 1/31 2/1/18               CP PRN np  @ home  NP  @ home

## 2018-02-21 NOTE — PROGRESS NOTES
PT Re-Evaluation     Today's date: 2018  Patient name: Calos Trinh  : 1955  MRN: [id number]  Referring provider: Tameka Tanner DO  Dx:   Encounter Diagnosis   Name Primary?  Pain of both shoulder joints Yes                  Assessment  Impairments: abnormal or restricted ROM, impaired physical strength and scapular dyskinesis    Assessment details: Calos Trinh is a 63 y o  male who presents to physical therapy with Pain of both shoulder joints  Pt states he continues to "baby" the L shoulder and sometimes compensates  Pt reports greater ease with reaching overhead but continues to have difficulty with reaching behind his back  Pt continues to have difficulty with heavy lifting  Pt has difficulty with reaching to the backseat of his car  Pt has improved PROM to full in all planes bilaterally  Pt has minor deficits remaining in AROM  Pt continues to have significant strength deficits in bilateral shoulders, R > L  Pt has greatest weakness into flexion, scaption, and external rotation  Calos Trinh would benefit from formal physical therapy to address impairments as detailed, decrease pain, and restore maximal level of function for all home, work, and mobility tasks  Thank you for this referral     Understanding of Dx/Px/POC: good   Prognosis: good    Goals  Short-term goals:  1  Pt will decrease pain by 1-2 points within 4 weeks  - met  2  Pt will improve ROM by 5-10 degrees within 4 weeks  - met  3  Pt will improve strength by 1/2 grade within 4 weeks  - partially met  4  Pt will improve physical FS score by 19 points by discharge  - partially met  Long-term goals  1  Pt will reach in all planes without scapular compensation and with pain <3/10 by discharge  - partially met  2  Pt will report compliance and demonstrate understanding of home exercise program by discharge  - met  3  Pt will [de-identified] 9year old daughter with pain <3/10 by discharge  - partially met    Plan  Patient would benefit from: skilled PT  Planned modality interventions: cryotherapy  Planned therapy interventions: flexibility, functional ROM exercises, joint mobilization, manual therapy, neuromuscular re-education, patient education, strengthening, stretching, postural training, therapeutic exercise and home exercise program  Frequency: 2x week  Duration in weeks: 4  Treatment plan discussed with: patient        Subjective Evaluation    History of Present Illness  Mechanism of injury: Pt feels about 70-80% improved since starting therapy  Pt has been compliant with therapy attendance and performing HEP  Pt states that MRI showed small tear in rotator cuff  Pt returns to [facility] in April  Pt is being scheduled for liver resection to address cyst and hemangioma  Pt denies numbness and tingling  Pt reports occasional popping in shoulders  Quality of life: good    Pain  At best pain ratin  At worst pain ratin  Location: bilateral shoulders  Quality: burning  Relieving factors: change in position  Progression: improved    Social Support    Employment status: working  Hand dominance: left      Diagnostic Tests  Abnormal MRI: partial rotator cuff tear  Treatments  Previous treatment: medication (did not help)  Patient Goals  Patient goals for therapy: increased motion and increased strength          Objective     Palpation   Left   Tenderness of the pectoralis major and pectoralis minor       Cervical/Thoracic Screen   Cervical range of motion within normal limits    Active Range of Motion   Left Shoulder   Flexion: 170 degrees   Abduction: 160 degrees   External rotation BTH: T3   Internal rotation BTB: T7     Right Shoulder   Flexion: 174 degrees   Abduction: 163 (pop with eccentric motion- persists at RE) degrees   External rotation BTH: T3   Internal rotation BTB: T7     Additional Active Range of Motion Details  Pt demonstrates [address] posture at rest, winging of bilateral scapulae; persists at RE    Passive Range of Motion   Left Shoulder Flexion: 178 degrees   Abduction: 176 degrees   External rotation 90°: 93 degrees   Internal rotation 90°: 90 degrees     Right Shoulder   Flexion: 180 degrees   Abduction: 178 degrees   External rotation 90°: 92 degrees   Internal rotation 90°: 85 degrees     Additional Passive Range of Motion Details  Pain at end range with overpressure- at RE stretch only, no pain    Strength/Myotome Testing     Left Shoulder     Planes of Motion   Flexion: 4+   Abduction: 4+ (pain)   Adduction: 5   External rotation at 0°: 4-   External rotation at 90°: 4+   Internal rotation at 0°: 5   Internal rotation at 90°: 5     Right Shoulder     Planes of Motion   Flexion: 4-   Abduction: 4-   Adduction: 5   External rotation at 0°: 4   External rotation at 90°: 5   Internal rotation at 0°: 5   Internal rotation at 90°: 5     Left Elbow   Flexion: 5  Extension: 5    Right Elbow   Flexion: 5  Extension: 5    Left Wrist/Hand   Wrist extension: 5  Wrist flexion: 5    Right Wrist/Hand   Wrist extension: 5  Wrist flexion: 5    Tests     Left Shoulder   Positive full can  Negative AC shear, drop arm, Hawkin's and painful arc  Right Shoulder   Positive full can and Hawkin's  Negative AC shear, drop arm and painful arc         Precautions: Dm, HTN, bee sting allergy    Daily Treatment Diary      Manual  1/24/ 1-25 1/31 2/1/18  2/5 2/12 2/14 2/21       Bilateral shoulder PROM np  PTA PK  HY PTA  18  15  15 min  20  10 min       Inferior glides bilateral shoulders np  np  NP  np    5 min  NP  np       Scapular mobs np  np  NP  np    np  NP  np        new measurements               15 min                                     Exercise Diary  1/24 1-25 1/31 2/1/18  2/5 2/12 2/14 2/21       Self-caudal glides 5"x2 ea  on table 5" x 10 ea  on table 5" x 10 ea  on table 5" x 10 ea  NV  nv  NV  dc       Strap IR stretch 15" x3 ea  15" x3 ea  15" x3 ea  15" x3 ea NV  30"x 3  3x30"  nv       TB IR/ER Org 10x ea  org 10x ea  Org 10xea   grn 10xea  org 10xea GTB NV  grn 15x ea   grn 15x ea  nv       Ube: Fwrd/bkwrd nv  3' ea  3'/3'  3'/3'   3'/3'  3 min ea  3'/3'  nv       pulleys  nv  unable, too tall    --------    --------     --------     -------     --------     --------       Tb shoulder extension nv  grn 3" x 15  Grn 15x3"  Grn 20x3"  Grn 20x3"  grn 3" 2x15 ea NV   nv       TB low rows nv  grn 3" x15  Grn 15x3"  Grn 20x3"  Grn 20x3"  grn 3" 2x15 ea  NV  nv       Ball circles at wall nv  nv  Red 20x ea   Red 30x ea  Red 30x ea  nv  NV  nv       Shoulder flexion full nv  10x  10x  15x  15x  2# 20x ea NV  20x       Shoulder scaption full  nv  10x  10x  15x  15x  2# 20x ea  NV  20x       Posterior capsule stretch nv  15" x3 ea  15" x 3 ea  15" x 3 ea  15" x 3 ea nv  NV  nv       Doorway pec stretch nv  nv  NV  15" x3  15" x3  nv  NV  nv       Scap retraction with TB ER nv  org 3" x10  Org 15x3"  Org 15x3"  NV  nv  NV  org 3"x20        serratus punch           4# 20x ea  NV  nv        PNF D1 & D2           nv  NV  10x ea        body blade IR/ER at 0deg & 45 deg               nv        sidelying ER               nv                                                                                     Modalities  1/24/18  1-25 1/31 2/1/18               CP PRN np  @ home  NP  @ home

## 2018-02-26 ENCOUNTER — OFFICE VISIT (OUTPATIENT)
Dept: PHYSICAL THERAPY | Facility: CLINIC | Age: 63
End: 2018-02-26
Payer: COMMERCIAL

## 2018-02-26 DIAGNOSIS — M25.512 PAIN OF BOTH SHOULDER JOINTS: Primary | ICD-10-CM

## 2018-02-26 DIAGNOSIS — M25.511 PAIN OF BOTH SHOULDER JOINTS: Primary | ICD-10-CM

## 2018-02-26 PROCEDURE — 97110 THERAPEUTIC EXERCISES: CPT

## 2018-02-26 PROCEDURE — 97140 MANUAL THERAPY 1/> REGIONS: CPT

## 2018-02-26 NOTE — PROGRESS NOTES
Daily Note     Today's date: 2018  Patient name: Lisa Enamorado  : 1955  MRN: [id number]  Referring provider: Rachele Salinas DO  Dx:   Encounter Diagnosis     ICD-10-CM    1  Pain of both shoulder joints M25 511     M25 512                   Subjective: Pt presents to PT denying pain now and states occ "achy pain" throughout the day        Objective: See treatment diary below    Precautions: Dm, HTN, bee sting allergy     Daily Treatment Diary      Manual  -18     Bilateral shoulder PROM np  PTA PK  HY PTA  18  15  15 min  20  10 min  20     Inferior glides bilateral shoulders np  np  NP  np    5 min  NP  np  np     Scapular mobs np  np  NP  np    np  NP  np  np      new measurements               15 min  np                                   Exercise Diary  18     Strap IR stretch 15" x3 ea  15" x3 ea  15" x3 ea  15" x3 ea NV  30"x 3  3x30"  nv  nv     TB IR/ER Org 10x ea  org 10x ea  Org 10xea   grn 10xea  org 10xea GTB NV  grn 15x ea   grn 15x ea  nv  nv     Ube: Fwrd/bkwrd nv  3' ea  3'/3'  3'/3'   3'/3'  3 min ea  3'/3'  nv  3'/3'     Tb shoulder extension nv  grn 3" x 15  Grn 15x3"  Grn 20x3"  Grn 20x3"  grn 3" 2x15 ea NV   nv  blue 3" x 15     TB low rows nv  grn 3" x15  Grn 15x3"  Grn 20x3"  Grn 20x3"  grn 3" 2x15 ea  NV  nv Blue     3" x 15     Ball circles at wall nv  nv  Red 20x ea   Red 30x ea  Red 30x ea  nv  NV  nv  yellow  X 30 ea     Shoulder flexion full nv  10x  10x  15x  15x  2# 20x ea NV  20x  2# 20x ea     Shoulder scaption full  nv  10x  10x  15x  15x  2# 20x ea  NV  20x  2# 20x ea     30Posterior capsule stretch nv  15" x3 ea  15" x 3 ea  15" x 3 ea  15" x 3 ea nv  NV  nv  30" x 3     Doorway pec stretch nv  nv  NV  15" x3  15" x3  nv  NV  nv  30" x3     Scap retraction with TB ER nv  org 3" x10  Org 15x3"  Org 15x3"  NV  nv  NV  org 3"x20  nv      serratus punch           4# 20x ea  NV  nv  nv      PNF D1 & D2           nv  NV  10x ea  15x      body blade IR/ER at 0deg & 45 deg               nv  30" x 3 ea      sidelying ER               nv  nv                                                                                   Modalities  1/24/18 1-25 1/31 2/1/18               CP PRN np  @ home  NP  @ home                                                                Assessment: Tolerated treatment well and does not offer reports of pain    Patient would benefit from continued PT and required occ cuing for correct technique  Plan: Continue per plan of care

## 2018-02-28 ENCOUNTER — OFFICE VISIT (OUTPATIENT)
Dept: PHYSICAL THERAPY | Facility: CLINIC | Age: 63
End: 2018-02-28
Payer: COMMERCIAL

## 2018-02-28 DIAGNOSIS — M25.511 PAIN OF BOTH SHOULDER JOINTS: Primary | ICD-10-CM

## 2018-02-28 DIAGNOSIS — M25.512 PAIN OF BOTH SHOULDER JOINTS: Primary | ICD-10-CM

## 2018-02-28 PROCEDURE — 97110 THERAPEUTIC EXERCISES: CPT

## 2018-02-28 PROCEDURE — G8991 OTHER PT/OT GOAL STATUS: HCPCS | Performed by: PHYSICAL THERAPIST

## 2018-02-28 PROCEDURE — 97140 MANUAL THERAPY 1/> REGIONS: CPT

## 2018-02-28 PROCEDURE — G8992 OTHER PT/OT  D/C STATUS: HCPCS | Performed by: PHYSICAL THERAPIST

## 2018-02-28 NOTE — PROGRESS NOTES
Daily Note     Today's date: 2018  Patient name: Amelie Samson  : 1955  MRN: [id number]  Referring provider: Sandee Zamora DO  Dx:   Encounter Diagnosis     ICD-10-CM    1  Pain of both shoulder joints M25 511     M25 512                   Subjective: Pt presents to PT denying pain now and states occ "achy pain" earlier in the day  Pt requested to leave at 5:40 pm today secondary to picking up his daughter  Pt denies pain post PT session         Objective: See treatment diary below    Precautions: Dm, HTN, bee sting allergy     Daily Treatment Diary      Manual      18   Bilateral shoulder PROM     HY PTA  18  15  15 min  20  10 min  20  18   Inferior glides bilateral shoulders     NP  np    5 min  NP  np  np  np   Scapular mobs     NP  np    np  NP  np  np  np    new measurements               15 min  np  np                                 Exercise Diary     18   Strap IR stretch    15" x3 ea  15" x3 ea NV  30"x 3  3x30"  nv  nv     TB IR/ER    Org 10xea   grn 10xea  org 10xea GTB NV  grn 15x ea   grn 15x ea  nv  nv   grn 15x ea   Ube: Fwrd/bkwrd    3'/3'  3'/3'   3'/3'  3 min ea  3'/3'  nv  3'/3'  3'/3'   Tb shoulder extension    Grn 15x3"  Grn 20x3"  Grn 20x3"  grn 3" 2x15 ea NV   nv  blue 3" x 15  blue 3" x 15   TB low rows    Grn 15x3"  Grn 20x3"  Grn 20x3"  grn 3" 2x15 ea  NV  nv Blue     3" x 15  blue 3" x 15   Ball circles at wall    Red 20x ea   Red 30x ea  Red 30x ea  nv  NV  nv  yellow  X 30 ea     Shoulder flexion full    10x  15x  15x  2# 20x ea NV  20x  2# 20x ea     Shoulder scaption full    10x  15x  15x  2# 20x ea  NV  20x  2# 20x ea     30Posterior capsule stretch    15" x 3 ea  15" x 3 ea  15" x 3 ea nv  NV  nv  30" x 3     Doorway pec stretch    NV  15" x3  15" x3  nv  NV  nv  30" x3     Scap retraction with TB ER    Org 15x3"  Org 15x3"  NV  nv  NV  org 3"x20  nv      serratus punch           4# 20x ea  NV  nv  nv      PNF D1 & D2           nv  NV  10x ea  15x      body blade IR/ER at 0deg & 45 deg               nv  30" x 3 ea  30" x 3 ea    sidelying ER               nv  nv                                                                                   Modalities     1/31 2/1/18               CP PRN    NP  @ home                                                                Assessment: Tolerated treatment well and does not offer reports of pain  Pt demonstrates good tolerance to manual therapy reporting increased flexibility  Noted most tightness in B shoulder in flexion and abduction  Progress as able  Plan: Continue per plan of care

## 2018-03-21 NOTE — PROGRESS NOTES
Please see 2/21/18 RE note for discharge measurements  Pt is discharged at this time secondary to he had surgery for liver resection  Pt will contact office if he wishes to resume therapy in the future